# Patient Record
(demographics unavailable — no encounter records)

---

## 2025-04-23 NOTE — ASSESSMENT
[FreeTextEntry1] : Left X-Ray Examination of the KNEE (4 views): medial and patellofemoral degenerate changes.   exacerbation of underlying arthritis  - We discussed their diagnosis and treatment options at length including the risks and benefits of both surgical treatment with a knee replacement and non-surgical options. Surgical risks include but are not limited to pain, infection, bleeding, vascular injury, numbness, tingling, nerve damage. - Due to risks of surgery, they will continue conservative treatment with PT, icing, and anti-inflammatory medications - The patient was provided with a PT prescription to work on ROM, hip ER/abductors strengthening, quad/hamstring stretches and strengthening, and other exercises - The patient was advised to let pain guide the gradual advancement of activities. - We also discussed the possible of a corticosteroid injection in order to help decrease inflammation and pain so that they can perform better therapy. - The risks, benefits, and alternatives to corticosteroid injection were reviewed with the patient and they wished to proceed with this treatment course. - Follow up as needed in 6 weeks to re-evaluate, if no improvement we spoke about possibility of viscosupplementation injections    Medication Discussion: 1) We discussed a comprehensive treatment plan that included possible pharmaceutical management involving the use of prescription strength medications including but not limited to options such as oral Naprosyn 500mg BID, once daily Meloxicam 15 mg, or 500-650 mg Tylenol versus over the counter oral medications in addition to discussing possible topical prescription Pennsaid vs  Voltaren gel. 2) There is a moderate risk of morbidity with further treatment, especially from use of prescription strength medications and possible side effects of these medications which include but are not limited to upset stomach with oral medications, skin reactions to topical medications and GI/cardiac/renal issues with long term use. 3) I recommended that the patient follow-up with their medical physician if there are any significant potential issues with long term medication use such as interactions with current medications or with exacerbation of underlying medical comorbidities. 4) The benefits and risks associated with use of oral and / or topical prescription and over the counter anti-inflammatory medications were discussed with the patient. The patient voiced understanding of the risks including but not limited to bleeding, stroke, kidney dysfunction, heart disease, and were referred to the black box warning label for further information.

## 2025-04-23 NOTE — IMAGING
[de-identified] :  LEFT KNEE Inspection:  mild effusion Palpation: medial joint line tenderness, anterior tenderness Knee Range of Motion:  3-125  Strength: 5/5 Quadriceps strength, 5/5 Hamstring strength Neurological: light touch is intact throughout Ligament Stability and Special Tests:  McMurrays: neg Lachman: neg Pivot Shift: neg Posterior Drawer: neg Valgus: neg Varus: neg Patella Apprehension: neg Patella Maltracking: neg

## 2025-04-23 NOTE — HISTORY OF PRESENT ILLNESS
[de-identified] : 52 year old female  ([employer] , walking)  chronic L knee pain since 2017 worsening since 2025 The pain is located  ant, lat, medialt and deep The pain is associated with clicking, and stiffness Worse with activity and better at rest. has tried activtiy mod

## 2025-05-08 NOTE — DISCUSSION/SUMMARY
[de-identified] : - discussed the etiology/ pathophysiology of the patient's complaints today in layman's terms - reviewed treatment options, conservative and operative as well as the indications for each - discussed conservative treatment options including the role for anti-inflammatory medications, injections, bracing and therapy - reviewed operative treatments including trigger thumb release and postoperative expectations - reviewed risks, benefits and alternatives to these - patient requests bilateral thumb CSI's, tolerated without complication - NSAIDs prn pain, Mobic Rx sent, cautioned patient on side effect profile and risks with concomitant NSAID use - f/u in 2 weeks   My cumulative time spent on this patient's visit included: Preparation for the visit, review of the medical records, review of pertinent diagnostic studies, examination and counseling of the patient on the above diagnosis, treatment plan and prognosis, orders of diagnostic tests, medications and/or appropriate procedures and documentation in the medical records of today's visit. 30 minutes were spent on this encounter

## 2025-05-08 NOTE — HISTORY OF PRESENT ILLNESS
[7] : 7 [3] : 3 [Dull/Aching] : dull/aching [Constant] : constant [de-identified] : 05/07/2025 JOSÉ JENKINS is a 52-year-old female here today for: Location: Bilateral Thumb Complaint: The patient presents to the office today evaluation of bilateral thumb clicking and locking. She notes the insidious onset of bilateral clicking and locking beginning roughly 2 months ago.  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

## 2025-05-08 NOTE — PROCEDURE
[FreeTextEntry1] : Corticosteroid injection [FreeTextEntry2] : Bilateral trigger thumbs [FreeTextEntry3] : The risks and benefits of steroid injections were discussed. Verbal consent was obtained, The site was prepped in a sterile fashion with alcohol A combination of 6 mg (1cc) of Celestone and 2cc 1% xylocaine were injected under sterile conditions at the level of the right thumb A1 pulley The site was prepped in a sterile fashion with alcohol A combination of 6 mg (1cc) of Celestone and 2cc 1% xylocaine were injected under sterile conditions at the level of the left thumb A1 pulley Patient tolerated the procedure without complication and was counseled on post injection expectations.

## 2025-05-08 NOTE — IMAGING
[de-identified] : Bilateral hands Triggering is observed in the thumbs TTP at the A1 pulley of the thumbs Able to make a full composite fist +AIN/ PIN/ Ulnar n SILT throughout fingers wwp   3 views bilateral thumbs: No acute fractures or malalignment, no cortical irregularities

## 2025-05-21 NOTE — DISCUSSION/SUMMARY
[de-identified] : - discussed the etiology/ pathophysiology of the patient's complaints today in layman's terms - reviewed treatment options, conservative and operative as well as the indications for each - discussed conservative treatment options including the role for anti-inflammatory medications, injections, bracing and therapy - reviewed operative treatments including trigger thumb release and postoperative expectations - she works for the [employer], would need to be OOW at least 10 days - reviewed risks, benefits and alternatives to these - too soon to repeat CSI, would not recommend repeat injection with risk of tendon rupture - recommend continued observation for now given her pain relief - activities as tolerated - NSAIDs prn pain - f/u prn   My cumulative time spent on this patient's visit included: Preparation for the visit, review of the medical records, review of pertinent diagnostic studies, examination and counseling of the patient on the above diagnosis, treatment plan and prognosis, orders of diagnostic tests, medications and/or appropriate procedures and documentation in the medical records of today's visit. 20 minutes were spent on this encounter

## 2025-05-21 NOTE — HISTORY OF PRESENT ILLNESS
[7] : 7 [3] : 3 [Dull/Aching] : dull/aching [Constant] : constant [de-identified] : 5/21/25: The patient returns office today for repeat evaluation of her bilateral trigger thumbs.  She received CSI's at her last visit notes improvement in her previous pain despite some mild persistent clicking.   05/07/2025 JOSÉ JENKINS is a 52-year-old female here today for: Location: Bilateral Thumb Complaint: The patient presents to the office today evaluation of bilateral thumb clicking and locking. She notes the insidious onset of bilateral clicking and locking beginning roughly 2 months ago.  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

## 2025-05-21 NOTE — IMAGING
[de-identified] : Bilateral hands Triggering improved, some catching bilateral thumbs NTTP at the A1 pulley of the thumbs Able to make a full composite fist +AIN/ PIN/ Ulnar n SILT throughout fingers wwp